# Patient Record
Sex: MALE | Race: WHITE | NOT HISPANIC OR LATINO | URBAN - METROPOLITAN AREA
[De-identification: names, ages, dates, MRNs, and addresses within clinical notes are randomized per-mention and may not be internally consistent; named-entity substitution may affect disease eponyms.]

---
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
## 2024-01-25 ENCOUNTER — TELEPHONE (OUTPATIENT)
Dept: FAMILY MEDICINE | Facility: CLINIC | Age: 19
End: 2024-01-25

## 2024-01-25 ENCOUNTER — HOSPITAL ENCOUNTER (OUTPATIENT)
Facility: CLINIC | Age: 19
End: 2024-01-25
Attending: INTERNAL MEDICINE | Admitting: INTERNAL MEDICINE
Payer: COMMERCIAL

## 2024-01-25 ENCOUNTER — OFFICE VISIT (OUTPATIENT)
Dept: FAMILY MEDICINE | Facility: CLINIC | Age: 19
End: 2024-01-25
Payer: COMMERCIAL

## 2024-01-25 ENCOUNTER — TELEPHONE (OUTPATIENT)
Dept: GASTROENTEROLOGY | Facility: CLINIC | Age: 19
End: 2024-01-25

## 2024-01-25 VITALS
HEIGHT: 68 IN | OXYGEN SATURATION: 98 % | TEMPERATURE: 97.8 F | DIASTOLIC BLOOD PRESSURE: 63 MMHG | BODY MASS INDEX: 18.04 KG/M2 | RESPIRATION RATE: 14 BRPM | WEIGHT: 119 LBS | SYSTOLIC BLOOD PRESSURE: 96 MMHG | HEART RATE: 69 BPM

## 2024-01-25 DIAGNOSIS — R11.2 NAUSEA AND VOMITING, UNSPECIFIED VOMITING TYPE: Primary | ICD-10-CM

## 2024-01-25 LAB
ALBUMIN SERPL BCG-MCNC: 4.8 G/DL (ref 3.5–5.2)
ALP SERPL-CCNC: 75 U/L (ref 65–260)
ALT SERPL W P-5'-P-CCNC: 20 U/L (ref 0–50)
ANION GAP SERPL CALCULATED.3IONS-SCNC: 9 MMOL/L (ref 7–15)
AST SERPL W P-5'-P-CCNC: 21 U/L (ref 0–35)
BILIRUB SERPL-MCNC: 0.6 MG/DL
BUN SERPL-MCNC: 17.9 MG/DL (ref 6–20)
CALCIUM SERPL-MCNC: 9.6 MG/DL (ref 8.6–10)
CHLORIDE SERPL-SCNC: 100 MMOL/L (ref 98–107)
CREAT SERPL-MCNC: 0.87 MG/DL (ref 0.67–1.17)
DEPRECATED HCO3 PLAS-SCNC: 28 MMOL/L (ref 22–29)
EGFRCR SERPLBLD CKD-EPI 2021: >90 ML/MIN/1.73M2
GLUCOSE SERPL-MCNC: 92 MG/DL (ref 70–99)
LIPASE SERPL-CCNC: 22 U/L (ref 13–60)
POTASSIUM SERPL-SCNC: 4.1 MMOL/L (ref 3.4–5.3)
PROT SERPL-MCNC: 7.7 G/DL (ref 6.4–8.3)
SODIUM SERPL-SCNC: 137 MMOL/L (ref 135–145)
TSH SERPL DL<=0.005 MIU/L-ACNC: 0.96 UIU/ML (ref 0.5–4.3)

## 2024-01-25 PROCEDURE — 36415 COLL VENOUS BLD VENIPUNCTURE: CPT | Performed by: FAMILY MEDICINE

## 2024-01-25 PROCEDURE — 80053 COMPREHEN METABOLIC PANEL: CPT | Performed by: FAMILY MEDICINE

## 2024-01-25 PROCEDURE — 86364 TISS TRNSGLTMNASE EA IG CLAS: CPT | Performed by: FAMILY MEDICINE

## 2024-01-25 PROCEDURE — 99214 OFFICE O/P EST MOD 30 MIN: CPT | Performed by: FAMILY MEDICINE

## 2024-01-25 PROCEDURE — 84443 ASSAY THYROID STIM HORMONE: CPT | Performed by: FAMILY MEDICINE

## 2024-01-25 PROCEDURE — 83993 ASSAY FOR CALPROTECTIN FECAL: CPT | Performed by: FAMILY MEDICINE

## 2024-01-25 PROCEDURE — 83690 ASSAY OF LIPASE: CPT | Performed by: FAMILY MEDICINE

## 2024-01-25 NOTE — TELEPHONE ENCOUNTER
Order/Referral Request    Who is requesting: PT mother    Orders being requested: ADULT GI  REFERRAL     Reason service is needed/diagnosis: Nausea and vomiting, unspecified vomiting type     When are orders needed by: ASAP    Has this been discussed with Provider: Yes    Does patient have a preference on a Group/Provider/Facility? [facility] in [place]    Does patient have an appointment scheduled?: No, had appt today wants to see if the referral can be sent to [facility] since [facility] is booking far out    Where to send orders: N/A    Could we send this information to you in [facility] or would you prefer to receive a phone call?:   Patient would prefer a phone call   Okay to leave a detailed message?: Yes at Cell number on file:    Telephone Information:   Mobile [phone]

## 2024-01-25 NOTE — PROGRESS NOTES
Assessment & Plan     Nausea and vomiting, unspecified vomiting type  Previous healthcare records reviewed including history of generalized anxiety disorder, depression, ADHD, currently off of pharmacotherapy. Previous ER records reviewed with unintentional poisoning by alcohol.  Consider other differentials including marijuana hyperemesis syndrome, peptic ulcer disease, patient is inquiring about an endoscopy and based on chronicity I think this is reasonable.  Benign abdominal exam today.  Will obtain additional labs as per below and follow-up with patient in 3 weeks for recheck.  Advised if he is have any bloody stools or bloody emesis he is to contact us immediately.  - CBC with platelets  - Comprehensive metabolic panel (BMP + Alb, Alk Phos, ALT, AST, Total. Bili, TP)  - TSH with free T4 reflex  - Tissue transglutaminase emanuel IgA and IgG  - Adult GI  Referral - Procedure Only  - Lipase  - Calprotectin Feces  - Comprehensive metabolic panel (BMP + Alb, Alk Phos, ALT, AST, Total. Bili, TP)  - TSH with free T4 reflex  - Tissue transglutaminase emanuel IgA and IgG  - Lipase  - Calprotectin Feces  - Helicobacter pylori Antigen Stool; Future        Subjective   Mahi is a 18 year old, presenting for the following health issues:  Abdominal Pain        1/25/2024     9:11 AM   Additional Questions   Roomed by Sudarshan Schrader   Accompanied by self     History of Present Illness       Reason for visit:  Stomach issues  Symptom onset:  1-2 weeks ago  Symptoms include:  [place] every morning  Symptom intensity:  Moderate  Symptom progression:  Staying the same  Had these symptoms before:  Yes  Has tried/received treatment for these symptoms:  No  What makes it worse:  Laying down  What makes it better:  Walking and standing up    He eats 0-1 servings of fruits and vegetables daily.He consumes 2 sweetened beverage(s) daily.He exercises with enough effort to increase his heart rate 10 to 19 minutes per day.  He exercises  "with enough effort to increase his heart rate 3 or less days per week.   He is taking medications regularly.       Patient is concerned for having celiac's disease.  Otherwise no travel outside the country fevers or unintentional weight loss.    No known family history of colorectal carcinoma, inflammatory bowel disease or irritable bowel syndrome.    Self weaned off of his mental health medications, currently a college student.    Recreational marijuana user.          Objective    BP 96/63 (BP Location: Right arm, Patient Position: Chair, Cuff Size: Adult Regular)   Pulse 69   Temp 97.8  F (36.6  C) (Oral)   Resp 14   Ht 1.715 m (5' 7.5\")   Wt 54 kg (119 lb)   SpO2 98%   BMI 18.36 kg/m    Body mass index is 18.36 kg/m .  Physical Exam  Constitutional:       Appearance: Normal appearance.   HENT:      Mouth/Throat:      Comments: No mucosal lesions  Cardiovascular:      Rate and Rhythm: Normal rate and regular rhythm.   Pulmonary:      Breath sounds: Normal breath sounds.   Abdominal:      General: Abdomen is flat.      Palpations: Abdomen is soft.   Musculoskeletal:      Right lower leg: No edema.      Left lower leg: No edema.   Skin:     Findings: No rash.   Neurological:      Mental Status: He is alert.                    Signed Electronically by: Steven Joe MD    "

## 2024-01-25 NOTE — TELEPHONE ENCOUNTER
"Endoscopy Scheduling Screen    Have you had a positive Covid test in the last 14 days?  No    Are you active on MyChart?   Yes    What insurance is in the chart?  Other:  [facility]    Ordering/Referring Provider:     SUNDEEP VARGHESE      (If ordering provider performs procedure, schedule with ordering provider unless otherwise instructed. )    BMI: Estimated body mass index is 18.36 kg/m  as calculated from the following:    Height as of an earlier encounter on 1/25/24: 1.715 m (5' 7.5\").    Weight as of an earlier encounter on 1/25/24: 54 kg (119 lb).     Sedation Ordered  moderate sedation.   If patient BMI > 50 do not schedule in [facility].    If patient BMI > 45 do not schedule at [facility].    Are you taking methadone or Suboxone?  No    Are you taking any prescription medications for pain 3 or more times per week?   NO - No RN review required.    Do you have a history of malignant hyperthermia or adverse reaction to anesthesia?  No    (Females) Are you currently pregnant?        Have you been diagnosed or told you have pulmonary hypertension?   No    Do you have an LVAD?  No    Have you been told you have moderate to severe sleep apnea?  No    Have you been told you have COPD, asthma, or any other lung disease?  No    Do you have any heart conditions?  No     Have you ever had an organ transplant?   No    Have you ever had or are you awaiting a heart or lung transplant?   No    Have you had a stroke or transient ischemic attack (TIA aka \"mini stroke\" in the last 6 months?   No    Have you been diagnosed with or been told you have cirrhosis of the liver?   No    Are you currently on dialysis?   No    Do you need assistance transferring?   No    BMI: Estimated body mass index is 18.36 kg/m  as calculated from the following:    Height as of an earlier encounter on 1/25/24: 1.715 m (5' 7.5\").    Weight as of an earlier encounter on 1/25/24: 54 kg (119 lb).     Is patients BMI > 40 and scheduling location [facility]?  No    Do you take an " injectable medication for weight loss or diabetes (excluding insulin)?  No    Do you take the medication Naltrexone?  No    Do you take blood thinners?  No       Prep   Are you currently on dialysis or do you have chronic kidney disease?  No    Do you have a diagnosis of diabetes?  No    Do you have a diagnosis of cystic fibrosis (CF)?  No    On a regular basis do you go 3 -5 days between bowel movements?      BMI > 40?      Preferred Pharmacy:    [id number] - [place], [place] - [address] [place] [address]  Phone: [phone] Fax: [phone]      Final Scheduling Details   Colonoscopy prep sent?      Procedure scheduled  Upper endoscopy (EGD)    Surgeon:  WALTER     Date of procedure:  4/11     Pre-OP / PAC:   No - Not required for this site.    Location  [facility] - Per order.    Sedation   Moderate Sedation - Per order.      Patient Reminders:   You will receive a call from a Nurse to review instructions and health history.  This assessment must be completed prior to your procedure.  Failure to complete the Nurse assessment may result in the procedure being cancelled.      On the day of your procedure, please designate an adult(s) who can drive you home stay with you for the next 24 hours. The medicines used in the exam will make you sleepy. You will not be able to drive.      You cannot take public transportation, ride share services, or non-medical taxi service without a responsible caregiver.  Medical transport services are allowed with the requirement that a responsible caregiver will receive you at your destination.  We require that drivers and caregivers are confirmed prior to your procedure.

## 2024-01-26 ENCOUNTER — APPOINTMENT (OUTPATIENT)
Dept: LAB | Facility: CLINIC | Age: 19
End: 2024-01-26
Payer: COMMERCIAL

## 2024-01-26 LAB
TTG IGA SER-ACNC: 88 U/ML
TTG IGG SER-ACNC: 135 U/ML

## 2024-01-29 LAB — CALPROTECTIN STL-MCNT: 37.5 MG/KG (ref 0–49.9)

## 2024-02-08 ENCOUNTER — TRANSFERRED RECORDS (OUTPATIENT)
Dept: HEALTH INFORMATION MANAGEMENT | Facility: CLINIC | Age: 19
End: 2024-02-08
Payer: COMMERCIAL

## 2024-03-09 ENCOUNTER — HEALTH MAINTENANCE LETTER (OUTPATIENT)
Age: 19
End: 2024-03-09

## 2024-03-28 ENCOUNTER — TELEPHONE (OUTPATIENT)
Dept: GASTROENTEROLOGY | Facility: CLINIC | Age: 19
End: 2024-03-28
Payer: COMMERCIAL

## 2024-03-28 NOTE — TELEPHONE ENCOUNTER
----- Message from Cindi Whiteside RN sent at 3/28/2024 12:05 PM CDT -----  Regarding: EGD completed at [facility]  Patient is scheduled for a Upper endoscopy (EGD)  Date of procedure: 4/11/24  Location:     Upon review patient completed an EGD on 2/8/24 with [facility] with no recommendation noted for repeat. This EGD may not be warranted any longer due to already completing.       Thank you,   Cindi Whiteside RN  Endoscopy Procedure Pre Assessment RN  [phone] option 4

## 2024-03-28 NOTE — TELEPHONE ENCOUNTER
Caller: Writer to patient    Reason for Reschedule/Cancellation   (please be detailed, any staff messages or encounters to note?): Completed at [facility].       Prior to reschedule please review:  Ordering Provider: Steven Joe MD   Sedation Determined: Moderate  Does patient have any ASC Exclusions, please identify?: No      Notes on Cancelled Procedure:  Procedure: Upper Endoscopy [EGD]   Date: 4/11/2024  Location: [facility]; [address] [place] [address]  Surgeon: Ravin      Rescheduled: No, not needed.        Did you cancel or rescheduled an EUS procedure? No.

## 2024-04-11 ENCOUNTER — VIRTUAL VISIT (OUTPATIENT)
Dept: FAMILY MEDICINE | Facility: CLINIC | Age: 19
End: 2024-04-11
Payer: COMMERCIAL

## 2024-04-11 DIAGNOSIS — K90.0 CELIAC DISEASE: Primary | ICD-10-CM

## 2024-04-11 PROCEDURE — 99213 OFFICE O/P EST LOW 20 MIN: CPT | Mod: 95 | Performed by: FAMILY MEDICINE

## 2024-04-11 NOTE — PROGRESS NOTES
Mahi is a 18 year old who is being evaluated via a billable video visit.    How would you like to obtain your AVS? MyChart  If the video visit is dropped, the invitation should be resent by: Text to cell phone: [phone]  Will anyone else be joining your video visit? No      Assessment & Plan     Celiac disease  Biopsy and lab confirmed.  Patient has been doing well on gluten free diet.  Continue current medical management        Subjective   Mahi is a 18 year old, presenting for the following health issues:  No chief complaint on file.        4/11/2024     4:55 PM   Additional Questions   Roomed by Sudarshan garrett       Video Start Time: 5:00 PM    HPI     Patient is a pleasant 18-year-old male who presents for follow-up abdominal pain and found to have biopsy confirmed celiac's.  He is overall doing better after going on a gluten-free diet, weight has been improving.  No bloody stools.              Objective           Vitals:  No vitals were obtained today due to virtual visit.    Physical Exam   GENERAL: alert and no distress  EYES: Eyes grossly normal to inspection.  No discharge or erythema, or obvious scleral/conjunctival abnormalities.  RESP: No audible wheeze, cough, or visible cyanosis.    SKIN: Visible skin clear. No significant rash, abnormal pigmentation or lesions.  NEURO: Cranial nerves grossly intact.  Mentation and speech appropriate for age.  PSYCH: Appropriate affect, tone, and pace of words          Video-Visit Details    Type of service:  Video Visit   Video End Time:5:11 PM  Originating Location (pt. Location): Home    Distant Location (provider location):  On-site  Platform used for Video Visit: Vasiliy  Signed Electronically by: Steven Joe MD

## 2025-02-25 ENCOUNTER — VIRTUAL VISIT (OUTPATIENT)
Dept: FAMILY MEDICINE | Facility: CLINIC | Age: 20
End: 2025-02-25
Payer: COMMERCIAL

## 2025-02-25 DIAGNOSIS — R11.2 NAUSEA AND VOMITING, UNSPECIFIED VOMITING TYPE: Primary | ICD-10-CM

## 2025-02-25 DIAGNOSIS — A08.4 VIRAL GASTROENTERITIS: ICD-10-CM

## 2025-02-25 PROCEDURE — 98005 SYNCH AUDIO-VIDEO EST LOW 20: CPT | Performed by: PHYSICIAN ASSISTANT

## 2025-02-25 RX ORDER — ONDANSETRON 4 MG/1
4 TABLET, ORALLY DISINTEGRATING ORAL EVERY 8 HOURS PRN
Qty: 8 TABLET | Refills: 0 | Status: SHIPPED | OUTPATIENT
Start: 2025-02-25

## 2025-02-25 ASSESSMENT — ENCOUNTER SYMPTOMS: VOMITING: 1

## 2025-02-25 NOTE — Clinical Note
2025    Mahi Brown   2005        To Whom it May Concern;    Please excuse Mahi Brown from work/school for a healthcare visit on 2025.    Sincerely,        Paige Tamez PA-C

## 2025-02-25 NOTE — LETTER
February 25, 2025      Mahi Brown  [address] [facility] [address]        To Whom It May Concern:    Mahi Brown  was seen on 2/25/25.  Please excuse him from school on 2/25/25 due to illness.      Sincerely,        Paige Tamez PA-C    Electronically signed

## 2025-02-25 NOTE — PROGRESS NOTES
Mahi is a 19 year old who is being evaluated via a billable video visit.    How would you like to obtain your AVS? MyChart  If the video visit is dropped, the invitation should be resent by: Text to cell phone: [phone]  Will anyone else be joining your video visit? No      Assessment & Plan     Nausea and vomiting, unspecified vomiting type  Viral gastroenteritis  Patient vomiting for about 1-2 hours this morning several times. Likely viral cause, potential food poisoning but less likely as he had pizza last night. Requesting zofran. Note given for school. I discussed with the patient risks and benefits of the new medication prescribed including potential side effects.  The patient had opportunity to ask questions and is comfortable with and interested in medications as prescribed.   - ondansetron (ZOFRAN ODT) 4 MG ODT tab; Take 1 tablet (4 mg) by mouth every 8 hours as needed for nausea.      The longitudinal plan of care for the diagnosis(es)/condition(s) as documented were addressed during this visit. Due to the added complexity in care, I will continue to support Mahi in the subsequent management and with ongoing continuity of care.                Subjective   Mahi is a 19 year old, presenting for the following health issues:  Vomiting      2/25/2025     8:01 AM   Additional Questions   Roomed by    Accompanied by none         2/25/2025     8:01 AM   Patient Reported Additional Medications   Patient reports taking the following new medications none     Video Start Time:  8:08 AM    Vomiting     History of Present Illness       Reason for visit:  I think i have food poising  Symptom onset:  Today  Symptoms include:  Stomach pain and naseua, soft stool  Symptom intensity:  Moderate  Symptom progression:  Staying the same  Had these symptoms before:  Yes  Has tried/received treatment for these symptoms:  No   He is taking medications regularly.     Started about 7 am                    Objective            Vitals:  No vitals were obtained today due to virtual visit.    Physical Exam   GENERAL: alert and no distress  EYES: Eyes grossly normal to inspection.  No discharge or erythema, or obvious scleral/conjunctival abnormalities.  RESP: No audible wheeze, cough, or visible cyanosis.    SKIN: Visible skin clear. No significant rash, abnormal pigmentation or lesions.  NEURO: Cranial nerves grossly intact.  Mentation and speech appropriate for age.  PSYCH: Appropriate affect, tone, and pace of words          Video-Visit Details    Type of service:  Video Visit   Video End Time:8:18 AM  Originating Location (pt. Location): Home    Distant Location (provider location):  On-site  Platform used for Video Visit: Vasiliy  Signed Electronically by: Paige Tamez PA-C

## 2025-03-16 ENCOUNTER — HEALTH MAINTENANCE LETTER (OUTPATIENT)
Age: 20
End: 2025-03-16